# Patient Record
Sex: MALE | Race: WHITE | NOT HISPANIC OR LATINO | URBAN - METROPOLITAN AREA
[De-identification: names, ages, dates, MRNs, and addresses within clinical notes are randomized per-mention and may not be internally consistent; named-entity substitution may affect disease eponyms.]

---

## 2021-12-01 ENCOUNTER — OFFICE VISIT (OUTPATIENT)
Dept: URBAN - METROPOLITAN AREA CLINIC 35 | Facility: CLINIC | Age: 51
End: 2021-12-01

## 2021-12-01 VITALS
OXYGEN SATURATION: 97 % | SYSTOLIC BLOOD PRESSURE: 122 MMHG | BODY MASS INDEX: 32.02 KG/M2 | DIASTOLIC BLOOD PRESSURE: 76 MMHG | HEIGHT: 67 IN | WEIGHT: 204 LBS | HEART RATE: 64 BPM

## 2021-12-01 RX ORDER — EPINEPHRINE INHALATION 125 UG/1
AS DIRECTED AEROSOL RESPIRATORY (INHALATION)
Status: ACTIVE | COMMUNITY

## 2021-12-01 RX ORDER — SODIUM SULFATE, POTASSIUM SULFATE, MAGNESIUM SULFATE 17.5; 3.13; 1.6 G/ML; G/ML; G/ML
AS DIRECTED SOLUTION, CONCENTRATE ORAL
Qty: 1 | Refills: 0 | OUTPATIENT
Start: 2021-12-01

## 2021-12-01 NOTE — HPI-MIGRATED HPI
;     Colorectal Cancer Screening : 50 year old male presents today for a consultation for a colorectal cancer screening. Patient admits this is his first colonoscopy due to age. He denies a family hx of colon, gastric, or esophageal cancer/polyps. Patient currently admits  1 bowel movement per day or every other day.  Stools are normal without blood, mucus or melena.  Patient denies heartburn.              No abdominal pain. No weight loss No CP, SOB or fever. No blood thinners. No sleep apnea. ;

## 2022-01-28 ENCOUNTER — TELEPHONE ENCOUNTER (OUTPATIENT)
Dept: URBAN - METROPOLITAN AREA CLINIC 36 | Facility: CLINIC | Age: 52
End: 2022-01-28

## 2022-01-31 ENCOUNTER — OFFICE VISIT (OUTPATIENT)
Dept: URBAN - METROPOLITAN AREA SURGERY CENTER 8 | Facility: SURGERY CENTER | Age: 52
End: 2022-01-31

## 2022-02-14 ENCOUNTER — OFFICE VISIT (OUTPATIENT)
Dept: URBAN - METROPOLITAN AREA CLINIC 35 | Facility: CLINIC | Age: 52
End: 2022-02-14

## 2022-06-27 ENCOUNTER — DASHBOARD ENCOUNTERS (OUTPATIENT)
Age: 52
End: 2022-06-27

## 2022-06-27 ENCOUNTER — LAB OUTSIDE AN ENCOUNTER (OUTPATIENT)
Dept: URBAN - METROPOLITAN AREA CLINIC 35 | Facility: CLINIC | Age: 52
End: 2022-06-27

## 2022-06-27 ENCOUNTER — OFFICE VISIT (OUTPATIENT)
Dept: URBAN - METROPOLITAN AREA CLINIC 35 | Facility: CLINIC | Age: 52
End: 2022-06-27
Payer: COMMERCIAL

## 2022-06-27 VITALS
HEIGHT: 67 IN | BODY MASS INDEX: 34.06 KG/M2 | HEART RATE: 55 BPM | WEIGHT: 217 LBS | DIASTOLIC BLOOD PRESSURE: 84 MMHG | SYSTOLIC BLOOD PRESSURE: 128 MMHG | OXYGEN SATURATION: 97 %

## 2022-06-27 DIAGNOSIS — R68.81 EARLY SATIETY: ICD-10-CM

## 2022-06-27 DIAGNOSIS — K75.81 NASH (NONALCOHOLIC STEATOHEPATITIS): ICD-10-CM

## 2022-06-27 DIAGNOSIS — Z12.11 ENCOUNTER FOR SCREENING FOR MALIGNANT NEOPLASM OF COLON: ICD-10-CM

## 2022-06-27 DIAGNOSIS — R14.0 ABDOMINAL BLOATING: ICD-10-CM

## 2022-06-27 PROBLEM — 442685003: Status: ACTIVE | Noted: 2022-06-27

## 2022-06-27 PROCEDURE — 99214 OFFICE O/P EST MOD 30 MIN: CPT | Performed by: PHYSICIAN ASSISTANT

## 2022-06-27 RX ORDER — SODIUM SULFATE, POTASSIUM SULFATE, MAGNESIUM SULFATE 17.5; 3.13; 1.6 G/ML; G/ML; G/ML
AS DIRECTED SOLUTION, CONCENTRATE ORAL
Qty: 1 | Refills: 0 | OUTPATIENT

## 2022-06-27 NOTE — HPI-BLOATING
49 y/o male patient admits persisting bloating, not necessarily gas. The onset was about several years. Patient denies associated symptoms such as abdominal pain, excessive belching but admits to indigestion and changes in bowel habits. Patient admits 1 formed bowel movement per day or every other day. Patient denies mucus, melena, or blood in stools.   Patient is having bloating throughout the day after eating. Patient states that he has to eat very small meals.  Patient states he has slow digestion, not necessarily indigestion.  Patient states that he has not tried any medications. Patient states that he has not had any recent changes in his medications.  Patient denies having a colonoscopy or EGD in the past. Patient denies family hx of colonic disease/cancer/polyps or esophageal disease/cancer.

## 2022-06-27 NOTE — HPI-FATTY LIVER
Patient states he was told he had fatty liver 20 years ago.  Since that time, he has lost weight, but he has not had it rechecked since.  Occasional alcohol use.

## 2022-06-27 NOTE — HPI-EARLY SATIETY
Patient admits to early satiety.  He states he will pretty much have early satiety after every meal.  Denies heartburn.

## 2022-06-27 NOTE — HPI-COLONOSCOPY SCREENING
51 year old male presents today for a consultation for a colorectal cancer screening. Patient admits this is his first colonoscopy due to age. He denies a family hx of colon, gastric, or esophageal cancer/polyps. Patient currently admits 1 bowel movement per day.  Stools are normal without blood, mucus or melena.  Patient denies heartburn.

## 2022-07-01 ENCOUNTER — TELEPHONE ENCOUNTER (OUTPATIENT)
Dept: URBAN - METROPOLITAN AREA CLINIC 35 | Facility: CLINIC | Age: 52
End: 2022-07-01

## 2022-07-28 ENCOUNTER — TELEPHONE ENCOUNTER (OUTPATIENT)
Dept: URBAN - METROPOLITAN AREA CLINIC 35 | Facility: CLINIC | Age: 52
End: 2022-07-28

## 2022-08-11 ENCOUNTER — OFFICE VISIT (OUTPATIENT)
Dept: URBAN - METROPOLITAN AREA SURGERY CENTER 8 | Facility: SURGERY CENTER | Age: 52
End: 2022-08-11

## 2022-08-18 ENCOUNTER — OFFICE VISIT (OUTPATIENT)
Dept: URBAN - METROPOLITAN AREA SURGERY CENTER 8 | Facility: SURGERY CENTER | Age: 52
End: 2022-08-18

## 2022-09-01 ENCOUNTER — OFFICE VISIT (OUTPATIENT)
Dept: URBAN - METROPOLITAN AREA CLINIC 35 | Facility: CLINIC | Age: 52
End: 2022-09-01